# Patient Record
Sex: FEMALE | Race: WHITE | URBAN - METROPOLITAN AREA
[De-identification: names, ages, dates, MRNs, and addresses within clinical notes are randomized per-mention and may not be internally consistent; named-entity substitution may affect disease eponyms.]

---

## 2017-11-21 ENCOUNTER — TRANSFERRED RECORDS (OUTPATIENT)
Dept: HEALTH INFORMATION MANAGEMENT | Facility: CLINIC | Age: 22
End: 2017-11-21

## 2018-01-30 ENCOUNTER — ANESTHESIA EVENT (OUTPATIENT)
Dept: SURGERY | Facility: CLINIC | Age: 23
End: 2018-01-30
Payer: COMMERCIAL

## 2018-01-30 RX ORDER — ALBUTEROL SULFATE 90 UG/1
2 AEROSOL, METERED RESPIRATORY (INHALATION) EVERY 6 HOURS PRN
COMMUNITY

## 2018-01-30 RX ORDER — LORATADINE 10 MG/1
10 TABLET ORAL DAILY PRN
COMMUNITY

## 2018-01-31 ENCOUNTER — ANESTHESIA (OUTPATIENT)
Dept: SURGERY | Facility: CLINIC | Age: 23
End: 2018-01-31
Payer: COMMERCIAL

## 2018-01-31 ENCOUNTER — HOSPITAL ENCOUNTER (OUTPATIENT)
Facility: CLINIC | Age: 23
Discharge: HOME | End: 2018-02-01
Attending: OBSTETRICS & GYNECOLOGY | Admitting: OBSTETRICS & GYNECOLOGY
Payer: COMMERCIAL

## 2018-01-31 ENCOUNTER — SURGERY (OUTPATIENT)
Age: 23
End: 2018-01-31
Payer: COMMERCIAL

## 2018-01-31 DIAGNOSIS — G89.18 ACUTE POST-OPERATIVE PAIN: Primary | ICD-10-CM

## 2018-01-31 DIAGNOSIS — T40.2X5A CONSTIPATION DUE TO OPIOID THERAPY: ICD-10-CM

## 2018-01-31 DIAGNOSIS — K59.03 CONSTIPATION DUE TO OPIOID THERAPY: ICD-10-CM

## 2018-01-31 PROBLEM — R10.2 PELVIC PAIN IN FEMALE: Status: ACTIVE | Noted: 2018-01-31

## 2018-01-31 LAB
B-HCG SERPL-ACNC: <1 IU/L (ref 0–5)
HGB BLD-MCNC: 13.7 G/DL (ref 11.7–15.7)

## 2018-01-31 PROCEDURE — 25000128 H RX IP 250 OP 636: Performed by: ANESTHESIOLOGY

## 2018-01-31 PROCEDURE — 25000125 ZZHC RX 250: Performed by: OBSTETRICS & GYNECOLOGY

## 2018-01-31 PROCEDURE — 88305 TISSUE EXAM BY PATHOLOGIST: CPT | Performed by: OBSTETRICS & GYNECOLOGY

## 2018-01-31 PROCEDURE — 25000125 ZZHC RX 250: Performed by: NURSE ANESTHETIST, CERTIFIED REGISTERED

## 2018-01-31 PROCEDURE — 27210794 ZZH OR GENERAL SUPPLY STERILE: Performed by: OBSTETRICS & GYNECOLOGY

## 2018-01-31 PROCEDURE — 88305 TISSUE EXAM BY PATHOLOGIST: CPT | Mod: 26 | Performed by: OBSTETRICS & GYNECOLOGY

## 2018-01-31 PROCEDURE — 25000128 H RX IP 250 OP 636: Performed by: NURSE ANESTHETIST, CERTIFIED REGISTERED

## 2018-01-31 PROCEDURE — 84702 CHORIONIC GONADOTROPIN TEST: CPT | Performed by: OBSTETRICS & GYNECOLOGY

## 2018-01-31 PROCEDURE — 25000128 H RX IP 250 OP 636: Performed by: OBSTETRICS & GYNECOLOGY

## 2018-01-31 PROCEDURE — 36000087 ZZH SURGERY LEVEL 8 EA 15 ADDTL MIN: Performed by: OBSTETRICS & GYNECOLOGY

## 2018-01-31 PROCEDURE — 37000008 ZZH ANESTHESIA TECHNICAL FEE, 1ST 30 MIN: Performed by: OBSTETRICS & GYNECOLOGY

## 2018-01-31 PROCEDURE — 37000009 ZZH ANESTHESIA TECHNICAL FEE, EACH ADDTL 15 MIN: Performed by: OBSTETRICS & GYNECOLOGY

## 2018-01-31 PROCEDURE — 36415 COLL VENOUS BLD VENIPUNCTURE: CPT | Performed by: OBSTETRICS & GYNECOLOGY

## 2018-01-31 PROCEDURE — 36000085 ZZH SURGERY LEVEL 8 1ST 30 MIN: Performed by: OBSTETRICS & GYNECOLOGY

## 2018-01-31 PROCEDURE — 40000170 ZZH STATISTIC PRE-PROCEDURE ASSESSMENT II: Performed by: OBSTETRICS & GYNECOLOGY

## 2018-01-31 PROCEDURE — 71000013 ZZH RECOVERY PHASE 1 LEVEL 1 EA ADDTL HR: Performed by: OBSTETRICS & GYNECOLOGY

## 2018-01-31 PROCEDURE — 40000934 ZZH STATISTIC OUTPATIENT (NON-OBS) DAY

## 2018-01-31 PROCEDURE — 25800025 ZZH RX 258: Performed by: OBSTETRICS & GYNECOLOGY

## 2018-01-31 PROCEDURE — C1765 ADHESION BARRIER: HCPCS | Performed by: OBSTETRICS & GYNECOLOGY

## 2018-01-31 PROCEDURE — 85018 HEMOGLOBIN: CPT | Performed by: OBSTETRICS & GYNECOLOGY

## 2018-01-31 PROCEDURE — 25000132 ZZH RX MED GY IP 250 OP 250 PS 637: Performed by: OBSTETRICS & GYNECOLOGY

## 2018-01-31 PROCEDURE — 71000012 ZZH RECOVERY PHASE 1 LEVEL 1 FIRST HR: Performed by: OBSTETRICS & GYNECOLOGY

## 2018-01-31 PROCEDURE — 25000128 H RX IP 250 OP 636

## 2018-01-31 PROCEDURE — 27210995 ZZH RX 272: Performed by: OBSTETRICS & GYNECOLOGY

## 2018-01-31 PROCEDURE — 40000936 ZZH STATISTIC OUTPATIENT (NON-OBS) NIGHT

## 2018-01-31 PROCEDURE — 25000566 ZZH SEVOFLURANE, EA 15 MIN: Performed by: OBSTETRICS & GYNECOLOGY

## 2018-01-31 PROCEDURE — 40000935 ZZH STATISTIC OUTPATIENT (NON-OBS) EVE

## 2018-01-31 RX ORDER — SODIUM CHLORIDE, SODIUM LACTATE, POTASSIUM CHLORIDE, CALCIUM CHLORIDE 600; 310; 30; 20 MG/100ML; MG/100ML; MG/100ML; MG/100ML
INJECTION, SOLUTION INTRAVENOUS CONTINUOUS
Status: DISCONTINUED | OUTPATIENT
Start: 2018-01-31 | End: 2018-02-01 | Stop reason: HOSPADM

## 2018-01-31 RX ORDER — ONDANSETRON 4 MG/1
4 TABLET, ORALLY DISINTEGRATING ORAL EVERY 30 MIN PRN
Status: DISCONTINUED | OUTPATIENT
Start: 2018-01-31 | End: 2018-01-31 | Stop reason: HOSPADM

## 2018-01-31 RX ORDER — IBUPROFEN 600 MG/1
600 TABLET, FILM COATED ORAL EVERY 6 HOURS PRN
Qty: 30 TABLET | Refills: 0 | Status: SHIPPED | OUTPATIENT
Start: 2018-01-31

## 2018-01-31 RX ORDER — OXYCODONE AND ACETAMINOPHEN 5; 325 MG/1; MG/1
1-2 TABLET ORAL EVERY 4 HOURS PRN
Qty: 30 TABLET | Refills: 0 | Status: SHIPPED | OUTPATIENT
Start: 2018-01-31

## 2018-01-31 RX ORDER — ALBUTEROL SULFATE 0.83 MG/ML
2.5 SOLUTION RESPIRATORY (INHALATION) EVERY 4 HOURS PRN
Status: DISCONTINUED | OUTPATIENT
Start: 2018-01-31 | End: 2018-01-31 | Stop reason: HOSPADM

## 2018-01-31 RX ORDER — ONDANSETRON 4 MG/1
4 TABLET, ORALLY DISINTEGRATING ORAL EVERY 6 HOURS PRN
Status: DISCONTINUED | OUTPATIENT
Start: 2018-01-31 | End: 2018-02-01 | Stop reason: HOSPADM

## 2018-01-31 RX ORDER — PROPOFOL 10 MG/ML
INJECTION, EMULSION INTRAVENOUS CONTINUOUS PRN
Status: DISCONTINUED | OUTPATIENT
Start: 2018-01-31 | End: 2018-01-31

## 2018-01-31 RX ORDER — SODIUM CHLORIDE, SODIUM LACTATE, POTASSIUM CHLORIDE, CALCIUM CHLORIDE 600; 310; 30; 20 MG/100ML; MG/100ML; MG/100ML; MG/100ML
INJECTION, SOLUTION INTRAVENOUS CONTINUOUS
Status: DISCONTINUED | OUTPATIENT
Start: 2018-01-31 | End: 2018-01-31 | Stop reason: HOSPADM

## 2018-01-31 RX ORDER — FENTANYL CITRATE 0.05 MG/ML
25-50 INJECTION, SOLUTION INTRAMUSCULAR; INTRAVENOUS
Status: DISCONTINUED | OUTPATIENT
Start: 2018-01-31 | End: 2018-01-31 | Stop reason: HOSPADM

## 2018-01-31 RX ORDER — MEPERIDINE HYDROCHLORIDE 25 MG/ML
12.5 INJECTION INTRAMUSCULAR; INTRAVENOUS; SUBCUTANEOUS
Status: DISCONTINUED | OUTPATIENT
Start: 2018-01-31 | End: 2018-01-31 | Stop reason: HOSPADM

## 2018-01-31 RX ORDER — KETOROLAC TROMETHAMINE 30 MG/ML
30 INJECTION, SOLUTION INTRAMUSCULAR; INTRAVENOUS EVERY 6 HOURS
Status: COMPLETED | OUTPATIENT
Start: 2018-01-31 | End: 2018-02-01

## 2018-01-31 RX ORDER — HYDROMORPHONE HYDROCHLORIDE 1 MG/ML
INJECTION, SOLUTION INTRAMUSCULAR; INTRAVENOUS; SUBCUTANEOUS
Status: COMPLETED
Start: 2018-01-31 | End: 2018-01-31

## 2018-01-31 RX ORDER — METOCLOPRAMIDE 10 MG/1
10 TABLET ORAL EVERY 6 HOURS PRN
Status: DISCONTINUED | OUTPATIENT
Start: 2018-01-31 | End: 2018-02-01 | Stop reason: HOSPADM

## 2018-01-31 RX ORDER — NALOXONE HYDROCHLORIDE 0.4 MG/ML
.1-.4 INJECTION, SOLUTION INTRAMUSCULAR; INTRAVENOUS; SUBCUTANEOUS
Status: DISCONTINUED | OUTPATIENT
Start: 2018-01-31 | End: 2018-01-31 | Stop reason: HOSPADM

## 2018-01-31 RX ORDER — NEOSTIGMINE METHYLSULFATE 1 MG/ML
VIAL (ML) INJECTION PRN
Status: DISCONTINUED | OUTPATIENT
Start: 2018-01-31 | End: 2018-01-31

## 2018-01-31 RX ORDER — METOCLOPRAMIDE HYDROCHLORIDE 5 MG/ML
10 INJECTION INTRAMUSCULAR; INTRAVENOUS EVERY 6 HOURS PRN
Status: DISCONTINUED | OUTPATIENT
Start: 2018-01-31 | End: 2018-02-01 | Stop reason: HOSPADM

## 2018-01-31 RX ORDER — SODIUM CHLORIDE, SODIUM LACTATE, POTASSIUM CHLORIDE, CALCIUM CHLORIDE 600; 310; 30; 20 MG/100ML; MG/100ML; MG/100ML; MG/100ML
INJECTION, SOLUTION INTRAVENOUS CONTINUOUS PRN
Status: DISCONTINUED | OUTPATIENT
Start: 2018-01-31 | End: 2018-01-31

## 2018-01-31 RX ORDER — ONDANSETRON 2 MG/ML
INJECTION INTRAMUSCULAR; INTRAVENOUS PRN
Status: DISCONTINUED | OUTPATIENT
Start: 2018-01-31 | End: 2018-01-31

## 2018-01-31 RX ORDER — CALCIUM CARBONATE 500 MG/1
1000 TABLET, CHEWABLE ORAL 4 TIMES DAILY PRN
Status: DISCONTINUED | OUTPATIENT
Start: 2018-01-31 | End: 2018-02-01 | Stop reason: HOSPADM

## 2018-01-31 RX ORDER — MAGNESIUM HYDROXIDE 1200 MG/15ML
LIQUID ORAL PRN
Status: DISCONTINUED | OUTPATIENT
Start: 2018-01-31 | End: 2018-01-31 | Stop reason: HOSPADM

## 2018-01-31 RX ORDER — ONDANSETRON 2 MG/ML
4 INJECTION INTRAMUSCULAR; INTRAVENOUS EVERY 6 HOURS PRN
Status: DISCONTINUED | OUTPATIENT
Start: 2018-01-31 | End: 2018-02-01 | Stop reason: HOSPADM

## 2018-01-31 RX ORDER — NALOXONE HYDROCHLORIDE 0.4 MG/ML
.1-.4 INJECTION, SOLUTION INTRAMUSCULAR; INTRAVENOUS; SUBCUTANEOUS
Status: DISCONTINUED | OUTPATIENT
Start: 2018-01-31 | End: 2018-02-01 | Stop reason: HOSPADM

## 2018-01-31 RX ORDER — HYDROXYZINE HYDROCHLORIDE 25 MG/1
25 TABLET, FILM COATED ORAL EVERY 6 HOURS PRN
Qty: 30 TABLET | Refills: 0 | Status: SHIPPED | OUTPATIENT
Start: 2018-01-31

## 2018-01-31 RX ORDER — KETOROLAC TROMETHAMINE 30 MG/ML
INJECTION, SOLUTION INTRAMUSCULAR; INTRAVENOUS PRN
Status: DISCONTINUED | OUTPATIENT
Start: 2018-01-31 | End: 2018-01-31

## 2018-01-31 RX ORDER — HYDROMORPHONE HYDROCHLORIDE 1 MG/ML
0.2 INJECTION, SOLUTION INTRAMUSCULAR; INTRAVENOUS; SUBCUTANEOUS
Status: DISCONTINUED | OUTPATIENT
Start: 2018-01-31 | End: 2018-02-01 | Stop reason: HOSPADM

## 2018-01-31 RX ORDER — PROPOFOL 10 MG/ML
INJECTION, EMULSION INTRAVENOUS PRN
Status: DISCONTINUED | OUTPATIENT
Start: 2018-01-31 | End: 2018-01-31

## 2018-01-31 RX ORDER — GLYCOPYRROLATE 0.2 MG/ML
INJECTION, SOLUTION INTRAMUSCULAR; INTRAVENOUS PRN
Status: DISCONTINUED | OUTPATIENT
Start: 2018-01-31 | End: 2018-01-31

## 2018-01-31 RX ORDER — HYDRALAZINE HYDROCHLORIDE 20 MG/ML
2.5-5 INJECTION INTRAMUSCULAR; INTRAVENOUS EVERY 10 MIN PRN
Status: DISCONTINUED | OUTPATIENT
Start: 2018-01-31 | End: 2018-01-31 | Stop reason: HOSPADM

## 2018-01-31 RX ORDER — VECURONIUM BROMIDE 1 MG/ML
INJECTION, POWDER, LYOPHILIZED, FOR SOLUTION INTRAVENOUS PRN
Status: DISCONTINUED | OUTPATIENT
Start: 2018-01-31 | End: 2018-01-31

## 2018-01-31 RX ORDER — IBUPROFEN 600 MG/1
600 TABLET, FILM COATED ORAL EVERY 6 HOURS PRN
Status: DISCONTINUED | OUTPATIENT
Start: 2018-02-01 | End: 2018-02-01 | Stop reason: HOSPADM

## 2018-01-31 RX ORDER — AMOXICILLIN 250 MG
1-2 CAPSULE ORAL 2 TIMES DAILY
Qty: 30 TABLET | Refills: 0 | Status: SHIPPED | OUTPATIENT
Start: 2018-01-31

## 2018-01-31 RX ORDER — BUPIVACAINE HYDROCHLORIDE AND EPINEPHRINE 2.5; 5 MG/ML; UG/ML
INJECTION, SOLUTION INFILTRATION; PERINEURAL PRN
Status: DISCONTINUED | OUTPATIENT
Start: 2018-01-31 | End: 2018-01-31 | Stop reason: HOSPADM

## 2018-01-31 RX ORDER — FENTANYL CITRATE 50 UG/ML
INJECTION, SOLUTION INTRAMUSCULAR; INTRAVENOUS PRN
Status: DISCONTINUED | OUTPATIENT
Start: 2018-01-31 | End: 2018-01-31

## 2018-01-31 RX ORDER — ONDANSETRON 2 MG/ML
4 INJECTION INTRAMUSCULAR; INTRAVENOUS EVERY 30 MIN PRN
Status: DISCONTINUED | OUTPATIENT
Start: 2018-01-31 | End: 2018-01-31 | Stop reason: HOSPADM

## 2018-01-31 RX ORDER — LIDOCAINE HYDROCHLORIDE 20 MG/ML
INJECTION, SOLUTION INFILTRATION; PERINEURAL PRN
Status: DISCONTINUED | OUTPATIENT
Start: 2018-01-31 | End: 2018-01-31

## 2018-01-31 RX ORDER — PROCHLORPERAZINE MALEATE 10 MG
10 TABLET ORAL EVERY 6 HOURS PRN
Status: DISCONTINUED | OUTPATIENT
Start: 2018-01-31 | End: 2018-02-01 | Stop reason: HOSPADM

## 2018-01-31 RX ORDER — CEFAZOLIN SODIUM 1 G
1 VIAL (EA) INJECTION SEE ADMIN INSTRUCTIONS
Status: DISCONTINUED | OUTPATIENT
Start: 2018-01-31 | End: 2018-01-31 | Stop reason: HOSPADM

## 2018-01-31 RX ORDER — OXYCODONE AND ACETAMINOPHEN 5; 325 MG/1; MG/1
1-2 TABLET ORAL EVERY 4 HOURS PRN
Status: DISCONTINUED | OUTPATIENT
Start: 2018-01-31 | End: 2018-02-01 | Stop reason: HOSPADM

## 2018-01-31 RX ORDER — HYDROXYZINE HYDROCHLORIDE 25 MG/1
25 TABLET, FILM COATED ORAL EVERY 6 HOURS PRN
Status: DISCONTINUED | OUTPATIENT
Start: 2018-01-31 | End: 2018-02-01 | Stop reason: HOSPADM

## 2018-01-31 RX ORDER — DEXAMETHASONE SODIUM PHOSPHATE 4 MG/ML
INJECTION, SOLUTION INTRA-ARTICULAR; INTRALESIONAL; INTRAMUSCULAR; INTRAVENOUS; SOFT TISSUE PRN
Status: DISCONTINUED | OUTPATIENT
Start: 2018-01-31 | End: 2018-01-31

## 2018-01-31 RX ORDER — LABETALOL HYDROCHLORIDE 5 MG/ML
10 INJECTION, SOLUTION INTRAVENOUS
Status: DISCONTINUED | OUTPATIENT
Start: 2018-01-31 | End: 2018-01-31 | Stop reason: HOSPADM

## 2018-01-31 RX ORDER — LIDOCAINE 40 MG/G
CREAM TOPICAL
Status: DISCONTINUED | OUTPATIENT
Start: 2018-01-31 | End: 2018-02-01 | Stop reason: HOSPADM

## 2018-01-31 RX ORDER — MULTIPLE VITAMINS W/ MINERALS TAB 9MG-400MCG
1 TAB ORAL DAILY
COMMUNITY

## 2018-01-31 RX ORDER — HYDROMORPHONE HYDROCHLORIDE 1 MG/ML
.3-.5 INJECTION, SOLUTION INTRAMUSCULAR; INTRAVENOUS; SUBCUTANEOUS EVERY 10 MIN PRN
Status: DISCONTINUED | OUTPATIENT
Start: 2018-01-31 | End: 2018-01-31 | Stop reason: HOSPADM

## 2018-01-31 RX ADMIN — Medication 0.5 MG: at 10:17

## 2018-01-31 RX ADMIN — HYDROMORPHONE HYDROCHLORIDE 0.2 MG: 1 INJECTION, SOLUTION INTRAMUSCULAR; INTRAVENOUS; SUBCUTANEOUS at 20:07

## 2018-01-31 RX ADMIN — FENTANYL CITRATE 50 MCG: 50 INJECTION, SOLUTION INTRAMUSCULAR; INTRAVENOUS at 10:34

## 2018-01-31 RX ADMIN — FENTANYL CITRATE 50 MCG: 50 INJECTION, SOLUTION INTRAMUSCULAR; INTRAVENOUS at 08:06

## 2018-01-31 RX ADMIN — GLYCOPYRROLATE 0.8 MG: 0.2 INJECTION, SOLUTION INTRAMUSCULAR; INTRAVENOUS at 09:36

## 2018-01-31 RX ADMIN — BUPIVACAINE HYDROCHLORIDE AND EPINEPHRINE BITARTRATE 30 ML: 2.5; .005 INJECTION, SOLUTION EPIDURAL; INFILTRATION; INTRACAUDAL; PERINEURAL at 09:28

## 2018-01-31 RX ADMIN — ROCURONIUM BROMIDE 50 MG: 10 INJECTION INTRAVENOUS at 07:38

## 2018-01-31 RX ADMIN — VECURONIUM BROMIDE 1 MG: 1 INJECTION, POWDER, LYOPHILIZED, FOR SOLUTION INTRAVENOUS at 09:00

## 2018-01-31 RX ADMIN — PROPOFOL 50 MCG/KG/MIN: 10 INJECTION, EMULSION INTRAVENOUS at 07:38

## 2018-01-31 RX ADMIN — FENTANYL CITRATE 50 MCG: 50 INJECTION, SOLUTION INTRAMUSCULAR; INTRAVENOUS at 10:05

## 2018-01-31 RX ADMIN — SODIUM CHLORIDE 1000 ML: 900 IRRIGANT IRRIGATION at 08:03

## 2018-01-31 RX ADMIN — VECURONIUM BROMIDE 1 MG: 1 INJECTION, POWDER, LYOPHILIZED, FOR SOLUTION INTRAVENOUS at 08:37

## 2018-01-31 RX ADMIN — DEXAMETHASONE SODIUM PHOSPHATE 4 MG: 4 INJECTION, SOLUTION INTRA-ARTICULAR; INTRALESIONAL; INTRAMUSCULAR; INTRAVENOUS; SOFT TISSUE at 07:45

## 2018-01-31 RX ADMIN — ONDANSETRON 4 MG: 2 INJECTION INTRAMUSCULAR; INTRAVENOUS at 10:49

## 2018-01-31 RX ADMIN — SODIUM CHLORIDE, POTASSIUM CHLORIDE, SODIUM LACTATE AND CALCIUM CHLORIDE: 600; 310; 30; 20 INJECTION, SOLUTION INTRAVENOUS at 09:01

## 2018-01-31 RX ADMIN — RANITIDINE 150 MG: 150 TABLET ORAL at 21:30

## 2018-01-31 RX ADMIN — MIDAZOLAM 2 MG: 1 INJECTION INTRAMUSCULAR; INTRAVENOUS at 07:34

## 2018-01-31 RX ADMIN — MEPERIDINE HYDROCHLORIDE 12.5 MG: 25 INJECTION, SOLUTION INTRAMUSCULAR; INTRAVENOUS; SUBCUTANEOUS at 10:15

## 2018-01-31 RX ADMIN — NEOSTIGMINE METHYLSULFATE 4 MG: 1 INJECTION INTRAMUSCULAR; INTRAVENOUS; SUBCUTANEOUS at 09:36

## 2018-01-31 RX ADMIN — HYDROMORPHONE HYDROCHLORIDE 0.2 MG: 1 INJECTION, SOLUTION INTRAMUSCULAR; INTRAVENOUS; SUBCUTANEOUS at 12:29

## 2018-01-31 RX ADMIN — KETOROLAC TROMETHAMINE 30 MG: 30 INJECTION, SOLUTION INTRAMUSCULAR at 21:30

## 2018-01-31 RX ADMIN — SODIUM CHLORIDE, POTASSIUM CHLORIDE, SODIUM LACTATE AND CALCIUM CHLORIDE: 600; 310; 30; 20 INJECTION, SOLUTION INTRAVENOUS at 07:33

## 2018-01-31 RX ADMIN — DEXMEDETOMIDINE HYDROCHLORIDE 8 MCG: 100 INJECTION, SOLUTION INTRAVENOUS at 08:02

## 2018-01-31 RX ADMIN — KETOROLAC TROMETHAMINE 30 MG: 30 INJECTION, SOLUTION INTRAMUSCULAR at 09:37

## 2018-01-31 RX ADMIN — ONDANSETRON 4 MG: 2 INJECTION INTRAMUSCULAR; INTRAVENOUS at 19:48

## 2018-01-31 RX ADMIN — CEFAZOLIN SODIUM 2 G: 2 SOLUTION INTRAVENOUS at 07:45

## 2018-01-31 RX ADMIN — ONDANSETRON 4 MG: 2 INJECTION INTRAMUSCULAR; INTRAVENOUS at 13:09

## 2018-01-31 RX ADMIN — PROPOFOL 200 MG: 10 INJECTION, EMULSION INTRAVENOUS at 07:38

## 2018-01-31 RX ADMIN — OXYCODONE HYDROCHLORIDE AND ACETAMINOPHEN 2 TABLET: 5; 325 TABLET ORAL at 13:40

## 2018-01-31 RX ADMIN — FENTANYL CITRATE 100 MCG: 50 INJECTION, SOLUTION INTRAMUSCULAR; INTRAVENOUS at 07:34

## 2018-01-31 RX ADMIN — VECURONIUM BROMIDE 1 MG: 1 INJECTION, POWDER, LYOPHILIZED, FOR SOLUTION INTRAVENOUS at 08:29

## 2018-01-31 RX ADMIN — FENTANYL CITRATE 50 MCG: 50 INJECTION, SOLUTION INTRAMUSCULAR; INTRAVENOUS at 10:30

## 2018-01-31 RX ADMIN — Medication 0.5 MG: at 11:10

## 2018-01-31 RX ADMIN — ONDANSETRON 4 MG: 2 INJECTION INTRAMUSCULAR; INTRAVENOUS at 09:30

## 2018-01-31 RX ADMIN — DEXMEDETOMIDINE HYDROCHLORIDE 12 MCG: 100 INJECTION, SOLUTION INTRAVENOUS at 07:56

## 2018-01-31 RX ADMIN — LIDOCAINE HYDROCHLORIDE 65 MG: 20 INJECTION, SOLUTION INFILTRATION; PERINEURAL at 07:38

## 2018-01-31 RX ADMIN — HYDROMORPHONE HYDROCHLORIDE 0.2 MG: 1 INJECTION, SOLUTION INTRAMUSCULAR; INTRAVENOUS; SUBCUTANEOUS at 16:24

## 2018-01-31 RX ADMIN — SODIUM CHLORIDE 1000 ML: 900 IRRIGANT IRRIGATION at 09:28

## 2018-01-31 RX ADMIN — KETOROLAC TROMETHAMINE 30 MG: 30 INJECTION, SOLUTION INTRAMUSCULAR at 15:41

## 2018-01-31 ASSESSMENT — PAIN DESCRIPTION - DESCRIPTORS
DESCRIPTORS: SORE;CRAMPING
DESCRIPTORS: CRAMPING;SORE

## 2018-01-31 NOTE — ANESTHESIA PREPROCEDURE EVALUATION
Anesthesia Evaluation     . Pt has had prior anesthetic.     No history of anesthetic complications          ROS/MED HX    ENT/Pulmonary:     (+)asthma , . .   (-) sleep apnea   Neurologic:       Cardiovascular:         METS/Exercise Tolerance:     Hematologic:         Musculoskeletal:         GI/Hepatic:     (+) GERD       Renal/Genitourinary: Comment: Endometriosis        Endo:         Psychiatric:         Infectious Disease:         Malignancy:         Other:                                    Anesthesia Plan      History & Physical Review  History and physical reviewed and following examination; no interval change.    ASA Status:  2 .    NPO Status:  > 8 hours    Plan for General and ETT with Intravenous induction. Maintenance will be Balanced.    PONV prophylaxis:  Ondansetron (or other 5HT-3) and Dexamethasone or Solumedrol (Propofol)       Postoperative Care  Postoperative pain management:  IV analgesics and Oral pain medications.      Consents  Anesthetic plan, risks, benefits and alternatives discussed with:  Patient..                      Procedure: Procedure(s):  DAVINCI ASSISTED ABLATION / EXCISION OF ENDOMETRIOSIS  COMBINED DILATION AND CURETTAGE, OPERATIVE HYSTEROSCOPY  CYSTOSCOPY  Preop diagnosis: PELVIC PAIN, ENDOMETRIOSIS    No Known Allergies  Past Medical History:   Diagnosis Date     Anxiety      Endometriosis      Myoclonus      PTSD (post-traumatic stress disorder)      Uncomplicated asthma      Past Surgical History:   Procedure Laterality Date     GYN SURGERY  08/2014    laparoscopy     GYN SURGERY  06/2016    laparoscopy     HEAD & NECK SURGERY      tonsillectomy     Social History   Substance Use Topics     Smoking status: Never Smoker     Smokeless tobacco: Never Used     Alcohol use Yes      Comment: OCCASIONALLY     Prior to Admission medications    Medication Sig Start Date End Date Taking? Authorizing Provider   multivitamin, therapeutic with minerals (THERA-VIT-M) TABS tablet Take 1  tablet by mouth daily   Yes Reported, Patient   Acetaminophen (TYLENOL PO) Take 325-650 mg by mouth daily as needed for mild pain or fever   Yes Reported, Patient   PANTOPRAZOLE SODIUM PO Take 40 mg by mouth every morning (before breakfast)   Yes Reported, Patient   NAPROXEN PO Take 550 mg by mouth 2 times daily as needed for moderate pain   Yes Reported, Patient   albuterol (PROAIR HFA/PROVENTIL HFA/VENTOLIN HFA) 108 (90 BASE) MCG/ACT Inhaler Inhale 2 puffs into the lungs every 6 hours as needed for shortness of breath / dyspnea or wheezing   Yes Reported, Patient   beclomethasone (QVAR) 80 MCG/ACT Inhaler Inhale 1 puff into the lungs 2 times daily   Yes Reported, Patient   loratadine (CLARITIN) 10 MG tablet Take 10 mg by mouth daily as needed    Yes Reported, Patient     Current Facility-Administered Medications Ordered in Epic   Medication Dose Route Frequency Last Rate Last Dose     ceFAZolin (ANCEF) intermittent infusion 2 g in 50 mL dextrose PREMIX  2 g Intravenous Pre-Op/Pre-procedure x 1 dose         ceFAZolin (ANCEF) 1g in 10 mL SWFI premix for IVP  1 g Intravenous See Admin Instructions         No current [facility]-ordered outpatient prescriptions on file.       Wt Readings from Last 1 Encounters:   01/31/18 84.5 kg (186 lb 4.8 oz)     Temp Readings from Last 1 Encounters:   01/31/18 36.7  C (98  F) (Temporal)     BP Readings from Last 6 Encounters:   01/31/18 132/82     Pulse Readings from Last 4 Encounters:   No data found for Pulse     Resp Readings from Last 1 Encounters:   01/31/18 16     SpO2 Readings from Last 1 Encounters:   01/31/18 98%     No results for input(s): NA, POTASSIUM, CHLORIDE, CO2, ANIONGAP, GLC, BUN, CR, ARTEMIO in the last 47072 hours.  No results for input(s): AST, ALT, ALKPHOS, BILITOTAL, LIPASE in the last 57352 hours.  Recent Labs   Lab Test  01/31/18   0559   HGB  13.7     No results for input(s): ABO, RH in the last 23697 hours.  No results for input(s): INR, PTT in the last 50856  hours.   No results for input(s): TROPI in the last 48666 hours.  No results for input(s): PH, PCO2, PO2, HCO3 in the last 16513 hours.  No results for input(s): HCG in the last 99809 hours.  No results found for this or any previous visit (from the past 744 hour(s)).    RECENT LABS:   ECG:   ECHO:

## 2018-01-31 NOTE — ANESTHESIA POSTPROCEDURE EVALUATION
Patient: Micki Lehman    Procedure(s):  DAVINCI LAPAROSCOPY, EXCISION OF ENDOMETRIOSIS, BILATERAL URTEROLYSIS, CYSTOSCOPY, HYSTEROSCOPY, DILATION AND CURETTAGE - Wound Class: I-Clean   - Wound Class: II-Clean Contaminated   - Wound Class: II-Clean Contaminated   - Wound Class: I-Clean    Diagnosis:PELVIC PAIN, ENDOMETRIOSIS  Diagnosis Additional Information: No value filed.    Anesthesia Type:  General, ETT    Note:  Anesthesia Post Evaluation    Patient location during evaluation: PACU  Patient participation: Able to fully participate in evaluation  Level of consciousness: awake  Pain management: adequate  Airway patency: patent  Cardiovascular status: acceptable  Respiratory status: acceptable  Hydration status: acceptable  PONV: none     Anesthetic complications: None          Last vitals:  Vitals:    01/31/18 1245 01/31/18 1340 01/31/18 1549   BP: 107/55 118/66 122/66   Resp: 18 16 16   Temp:   37.2  C (99  F)   SpO2: 96% 99% 97%         Electronically Signed By: Nubia Hollis MD, MD  January 31, 2018  4:13 PM

## 2018-01-31 NOTE — PROGRESS NOTES
Patient assisted to the bathroom.  SBA.  Will bladder scan post void to check for residual amounts.

## 2018-01-31 NOTE — OR NURSING
Report given to charge nurse on obs unit at 11:44.  Pt going to room 20. Verbalizes readiness for discharge to her room.

## 2018-01-31 NOTE — IP AVS SNAPSHOT
[facility] Observation Unit    [address] [place] [id number]    Phone:  [phone]                                       After Visit Summary   1/31/2018    Micki Lehman    MRN: [id number]           After Visit Summary Signature Page     I have received my discharge instructions, and my questions have been answered. I have discussed any challenges I see with this plan with the nurse or doctor.    ..........................................................................................................................................  Patient/Patient Representative Signature      ..........................................................................................................................................  Patient Representative Print Name and Relationship to Patient    ..................................................               ................................................  Date                                            Time    ..........................................................................................................................................  Reviewed by Signature/Title    ...................................................              ..............................................  Date                                                            Time

## 2018-01-31 NOTE — PROGRESS NOTES
Admission medication history interview status for the 1/31/2018  admission is complete. See EPIC admission navigator for prior to admission medications     Medication history source reliability:Good    Medication history interview source(s):Patient    Medication history resources (including written lists, pill bottles, clinic record):None    Primary pharmacy.[facility]    Additional medication history information not noted on PTA med list :None    Time spent in this activity: 45 MINUTES    Prior to Admission medications    Medication Sig Last Dose Taking? Auth Provider   multivitamin, therapeutic with minerals (THERA-VIT-M) TABS tablet Take 1 tablet by mouth daily 1/24/2018 Yes Reported, Patient   Acetaminophen (TYLENOL PO) Take 325-650 mg by mouth daily as needed for mild pain or fever 1/29/2018 at PRN Yes Reported, Patient   PANTOPRAZOLE SODIUM PO Take 40 mg by mouth every morning (before breakfast) 1/30/2018 at AM Yes Reported, Patient   NAPROXEN PO Take 550 mg by mouth 2 times daily as needed for moderate pain 1/17/2018 at PRN Yes Reported, Patient   albuterol (PROAIR HFA/PROVENTIL HFA/VENTOLIN HFA) 108 (90 BASE) MCG/ACT Inhaler Inhale 2 puffs into the lungs every 6 hours as needed for shortness of breath / dyspnea or wheezing MORE THAN A WEEK at PRN Yes Reported, Patient   beclomethasone (QVAR) 80 MCG/ACT Inhaler Inhale 1 puff into the lungs 2 times daily 1/30/2018 at PM Yes Reported, Patient   loratadine (CLARITIN) 10 MG tablet Take 10 mg by mouth daily as needed  MORE THAN A MONTH at PRN Yes Reported, Patient

## 2018-01-31 NOTE — IP AVS SNAPSHOT
MRN:[id number]                      After Visit Summary   1/31/2018    Micki Lehman    MRN: [id number]           Thank you!     Thank you for choosing [facility] for your care. Our goal is always to provide you with excellent care. Hearing back from our patients is one way we can continue to improve our services. Please take a few minutes to complete the written survey that you may receive in the mail after you visit with us. Thank you!        Patient Information     Date Of Birth          1995        Designated Caregiver       Most Recent Value    Caregiver    Will someone help with your care after discharge? yes    Name of designated caregiver ABEBA BEVERLY    Phone number of caregiver [phone]    Caregiver address [address] [facility] [address]      About your hospital stay     You were admitted on:  January 31, 2018 [facility]    You were discharged on:  February 1, 2018       Who to Call     For medical emergencies, please call 911.  For non-urgent questions about your medical care, please call your primary care provider or clinic, None  For questions related to your surgery, please call your surgery clinic        Attending Provider     Provider Ck Troy MD OB/Gyn       Primary Care Provider Fax #    Provider Not In System [phone]      After Care Instructions     Diet Instructions       Resume pre-procedure diet            Discharge Instructions       Patient to follow up with appointment in 2 weeks            No Alcohol       For 24 hours post procedure            No driving or operating machinery        until off of narcotic pain medications            No lifting        No lifting over 25 lbs and no strenuous physical activity for 2 weeks            Shower       No shower for 24 hours post procedure. May shower Postoperative Day (POD)  1                  Pending Results     Date and Time Order  "Name Status Description    2018 0823 Surgical pathology exam In process             Statement of Approval     Ordered          18 0654  I have reviewed and agree with all the recommendations and orders detailed in this document.  EFFECTIVE NOW     Approved and electronically signed by:  Ck Crowley MD             Admission Information     Date & Time Provider Department Dept. Phone    2018 Ck Crowley MD [facility] Observation Unit [phone]      Your Vitals Were     Blood Pressure Temperature Respirations Height Weight Last Period    103/48 (BP Location: Right arm) 99  F (37.2  C) (Oral) 16 1.626 m (5' 4\") 84.5 kg (186 lb 4.8 oz) 2018    Pulse Oximetry BMI (Body Mass Index)                95% 31.98 kg/m2          [employer] Information     [employer] lets you send messages to your doctor, view your test results, renew your prescriptions, schedule appointments and more. To sign up, go to [email or web address] . Click on \"Log in\" on the left side of the screen, which will take you to the Welcome page. Then click on \"Sign up Now\" on the right side of the page.     You will be asked to enter the access code listed below, as well as some personal information. Please follow the directions to create your username and password.     Your access code is: [email or web address]  Expires: 2018  4:59 PM     Your access code will  in 90 days. If you need help or a new code, please call your [facility] clinic or [phone].        Care EveryWhere ID     This is your Care EveryWhere ID. This could be used by other organizations to access your [facility] medical records  [id number]        Equal Access to Services     [facility] AH: Hadii jennifer Tran, waavtarda luqadaha, qaybta kaalmada adedenizda, panfilo andrade. So [facility] [phone].    ATENCIÓN: Si habla español, tiene a pfeiffer disposición servicios gratuitos de asistencia lingüística. Llame al " [phone].    We comply with applicable federal civil rights laws and [place] laws. We do not discriminate on the basis of race, color, national origin, age, disability, sex, sexual orientation, or gender identity.               Review of your medicines      START taking        Dose / Directions    hydrOXYzine 25 MG tablet   Commonly known as:  ATARAX   Used for:  Acute post-operative pain        Dose:  25 mg   Take 1 tablet (25 mg) by mouth every 6 hours as needed for itching (and nausea and pain)   Quantity:  30 tablet   Refills:  0       ibuprofen 600 MG tablet   Commonly known as:  ADVIL/MOTRIN   Used for:  Acute post-operative pain        Dose:  600 mg   Take 1 tablet (600 mg) by mouth every 6 hours as needed for pain (mild)   Quantity:  30 tablet   Refills:  0       oxyCODONE-acetaminophen 5-325 MG per tablet   Commonly known as:  PERCOCET   Used for:  Acute post-operative pain        Dose:  1-2 tablet   Take 1-2 tablets by mouth every 4 hours as needed for pain (moderate to severe)   Quantity:  30 tablet   Refills:  0       senna-docusate 8.6-50 MG per tablet   Commonly known as:  SENOKOT-S;PERICOLACE   Used for:  Constipation due to opioid therapy        Dose:  1-2 tablet   Take 1-2 tablets by mouth 2 times daily Take while on oral narcotics to prevent or treat constipation.   Quantity:  30 tablet   Refills:  0         CONTINUE these medicines which have NOT CHANGED        Dose / Directions    albuterol 108 (90 BASE) MCG/ACT Inhaler   Commonly known as:  PROAIR HFA/PROVENTIL HFA/VENTOLIN HFA        Dose:  2 puff   Inhale 2 puffs into the lungs every 6 hours as needed for shortness of breath / dyspnea or wheezing   Refills:  0       beclomethasone 80 MCG/ACT Inhaler   Commonly known as:  QVAR        Dose:  1 puff   Inhale 1 puff into the lungs 2 times daily   Refills:  0       loratadine 10 MG tablet   Commonly known as:  CLARITIN        Dose:  10 mg   Take 10 mg by mouth daily as needed   Refills:  0        multivitamin, therapeutic with minerals Tabs tablet        Dose:  1 tablet   Take 1 tablet by mouth daily   Refills:  0       PANTOPRAZOLE SODIUM PO        Dose:  40 mg   Take 40 mg by mouth every morning (before breakfast)   Refills:  0         STOP taking     NAPROXEN PO           TYLENOL PO                Where to get your medicines      Some of these will need a paper prescription and others can be bought over the counter. Ask your nurse if you have questions.     Bring a paper prescription for each of these medications     hydrOXYzine 25 MG tablet    ibuprofen 600 MG tablet    oxyCODONE-acetaminophen 5-325 MG per tablet    senna-docusate 8.6-50 MG per tablet                Protect others around you: Learn how to safely use, store and throw away your medicines at www.disposemymeds.org.        Information about OPIOIDS     PRESCRIPTION OPIOIDS: WHAT YOU NEED TO KNOW    Prescription opioids can be used to help relieve moderate to severe pain and are often prescribed following a surgery or injury, or for certain health conditions. These medications can be an important part of treatment but also come with serious risks. It is important to work with your health care provider to make sure you are getting the safest, most effective care.    WHAT ARE THE RISKS AND SIDE EFFECTS OF OPIOID USE?  Prescription opioids carry serious risks of addiction and overdose, especially with prolonged use. An opioid overdose, often marked by slowed breathing can cause sudden death. The use of prescription opioids can have a number of side effects as well, even when taken as directed:      Tolerance - meaning you might need to take more of a medication for the same pain relief    Physical dependence - meaning you have symptoms of withdrawal when a medication is stopped    Increased sensitivity to pain    Constipation    Nausea, vomiting, and dry mouth    Sleepiness and dizziness    Confusion    Depression    Low levels of testosterone  that can result in lower sex drive, energy, and strength    Itching and sweating    RISKS ARE GREATER WITH:    History of drug misuse, substance use disorder, or overdose    Mental health conditions (such as depression or anxiety)    Sleep apnea    Older age (65 years or older)    Pregnancy    Avoid alcohol while taking prescription opioids.   Also, unless specifically advised by your health care provider, medications to avoid include:    Benzodiazepines (such as Xanax or Valium)    Muscle relaxants (such as Soma or Flexeril)    Hypnotics (such as Ambien or Lunesta)    Other prescription opioids    KNOW YOUR OPTIONS:  Talk to your health care provider about ways to manage your pain that do not involve prescription opioids. Some of these options may actually work better and have fewer risks and side effects:    Pain relievers such as acetaminophen, ibuprofen, and naproxen    Some medications that are also used for depression or seizures    Physical therapy and exercise    Cognitive behavioral therapy, a psychological, goal-directed approach, in which patients learn how to modify physical, behavioral, and emotional triggers of pain and stress    IF YOU ARE PRESCRIBED OPIOIDS FOR PAIN:    Never take opioids in greater amounts or more often than prescribed    Follow up with your primary health care provider and work together to create a plan on how to manage your pain.    Talk about ways to help manage your pain that do not involve prescription opioids    Talk about all concerns and side effects    Help prevent misuse and abuse    Never sell or share prescription opioids    Never use another person's prescription opioids    Store prescription opioids in a secure place and out of reach of others (this may include visitors, children, friends, and family)    Visit www.cdc.gov/drugoverdose to learn about risks of opioid abuse and overdose    If you believe you may be struggling with addiction, tell your health care provider  and ask for guidance or call [facility]'s National Helpline at 8-231-038-HELP    LEARN MORE / www.cdc.gov/drugoverdose/prescribing/guideline.html    Safely dispose of unused prescription opioids: Find your local drug take-back programs and more information about the importance of safe disposal at www.doseofreality.mn.gov             Medication List: This is a list of all your medications and when to take them. Check marks below indicate your daily home schedule. Keep this list as a reference.      Medications           Morning Afternoon Evening Bedtime As Needed    albuterol 108 (90 BASE) MCG/ACT Inhaler   Commonly known as:  PROAIR HFA/PROVENTIL HFA/VENTOLIN HFA   Inhale 2 puffs into the lungs every 6 hours as needed for shortness of breath / dyspnea or wheezing                                   beclomethasone 80 MCG/ACT Inhaler   Commonly known as:  QVAR   Inhale 1 puff into the lungs 2 times daily                                      hydrOXYzine 25 MG tablet   Commonly known as:  ATARAX   Take 1 tablet (25 mg) by mouth every 6 hours as needed for itching (and nausea and pain)   Last time this was given:  25 mg on 2/1/2018 12:06 AM                                   ibuprofen 600 MG tablet   Commonly known as:  ADVIL/MOTRIN   Take 1 tablet (600 mg) by mouth every 6 hours as needed for pain (mild)   Next Dose Due:  Can take at 5:00 pm                                   loratadine 10 MG tablet   Commonly known as:  CLARITIN   Take 10 mg by mouth daily as needed                                   multivitamin, therapeutic with minerals Tabs tablet   Take 1 tablet by mouth daily                                   oxyCODONE-acetaminophen 5-325 MG per tablet   Commonly known as:  PERCOCET   Take 1-2 tablets by mouth every 4 hours as needed for pain (moderate to severe)   Last time this was given:  2 tablets on 2/1/2018 11:55 AM   Next Dose Due:  Can take at 4:00 pm                                   PANTOPRAZOLE SODIUM PO   Take  40 mg by mouth every morning (before breakfast)                                   senna-docusate 8.6-50 MG per tablet   Commonly known as:  SENOKOT-S;PERICOLACE   Take 1-2 tablets by mouth 2 times daily Take while on oral narcotics to prevent or treat constipation.                                                More Information        Discharge Instructions for Laparoscopic Treatment of Endometriosis  You have been diagnosed with endometriosis, a disease that affects your reproductive organs and your monthly menstrual cycle. It can cause cramps and pain during your periods or pelvic pain throughout the month. Some cases cause infertility (the inability to become pregnant).  There is no cure for endometriosis, but you can be treated. You and your doctor decided on laparoscopic treatment for you. During your procedure, the doctor made small incisions in your abdomen and used surgical instruments to remove or treat the diseased tissue. Your incisions and the area around them may be sore or tender. You may also feel pain in your upper back or shoulders. This is from the gas used to enlarge your abdomen to allow your doctor to see and treat the endometriosis. This pain usually goes away within a day or two.  Here's what you can do at home to help with your recovery.  Activity    Plan to rest for a week after your surgery, although you may feel OK within a few days.    While you recover, have friends or family help you with chores and errands.    Walk as often as you feel able.    Don t lift anything heavier than 10 pounds to avoid straining your incisions.    Don t push a vacuum or do other strenuous housework until the doctor says it s OK.    Climb stairs slowly and pause after every few steps.    Don t drive for a few days after the surgery. You may drive as soon as you are able to move comfortably from side to side as long as you aren't taking any narcotics.  Other home care    Take your medication exactly as  directed. Don t skip doses.    Continue with the coughing and deep breathing exercises that you learned in the hospital.    Avoid constipation.    Eat fruits, vegetables, and whole grains.    Drink 6 to 8 glasses of water every day unless directed otherwise.    Use a laxative or a mild stool softener if your doctor says it s OK.    Shower as usual.    Wash your incision with mild soap and water. Pat it dry. Don t use oils, powders, or lotions on your incision.    Don t have sexual intercourse or use tampons or douches until your doctor says it s safe to do so.    Report hot flashes, mood swings, and irritability to your doctor. There may be medications that can help you.  Follow-up  Make a follow-up appointment as directed by our staff.     When to call your doctor  Call your doctor right away if you have any of the following:    Redness, swelling, or drainage at your incision site    Fever of 100.4 F (38 C) or higher    Pain that is not relieved by your medication    Any unusual bleeding    Dizziness or fainting    Abdominal pain and swelling that get worse    Nausea and vomiting   Date Last Reviewed: 5/19/2015 2000-2017 [employer]. [address] [place] [address], [place] [address]. All rights reserved. This information is not intended as a substitute for professional medical care. Always follow your healthcare professional's instructions.

## 2018-01-31 NOTE — PLAN OF CARE
Problem: Patient Care Overview  Goal: Plan of Care/Patient Progress Review  Outcome: Improving  A&O. VSS on 1L. SBA. Umbilicus lap site oozing, reinforced. Advance diet as tolerated, tolerating liquids. Voided small amount. Bladder scanned 32 mL. IV Dilaudid given x1 with decrease in pain. Pain increased with bladder scan, Percocet given x1 with relief. Ice pack provided. IV Zofran given x1 with relief. Will continue to monitor.

## 2018-01-31 NOTE — OP NOTE
Procedure Date: 01/31/2018      PREOPERATIVE DIAGNOSES:   1.  Pelvic pain.   2.  Heavy menstrual bleeding.      POSTOPERATIVE DIAGNOSES:   1.  Pelvic pain.   2.  Heavy menstrual bleeding.      PROCEDURES:   1.  Da Cherry pelviscopy.   2.  Excision of endometriosis.   3.  Bilateral ureterolysis.   4.  Hysteroscopy with dilatation and curettage.   5.  Cystoscopy.      SURGEON:  Dr. Crowley.      ASSISTANT:  Li SIMPSON     ANESTHESIA:  General.      ESTIMATED BLOOD LOSS:  20 mL.      COMPLICATIONS:  None.      FINDINGS:   1.  There was a normal upper abdomen without evidence of diaphragmatic endometriosis or perihepatic adhesions.   2.  There was approximately a 1 cm patch of clear red peritoneal changes noted in the left anterior cul-de-sac.   3.  There was a 1.1 cm x 2 cm patch of red gelatinous endometriosis on the posterior uterine serosa.   4.  There were red, clear, white fibrotic lesions noted throughout both ovarian fossae, which were distorting the anatomy and also overlying the ureters, necessitating ureterolysis.   5.  There was a 1 cm area of denser, more invasive endometriosis in the right ovarian fossa just lateral to the ureter.   6.  There were red type lesions noted in the posterior cul-de-sac, encompassing approximately 2 x 3 cm.   7.  There was a normal hysteroscopy without evidence of polyps or fibroids.   8.  There is a normal cystoscopy without evidence of interstitial cystitis or bladder injury, and there was brisk flow of urine from both ureteral ostia.      INDICATIONS:  The patient is a 22-year-old female with a history of pelvic pain, including several laparoscopies.  She has had persistent symptoms despite having used hormonal suppression with birth control pills as well as Depo-Lupron.  Given this, the decision was made to proceed surgically to look for persistent endometriosis.      DESCRIPTION OF PROCEDURE:  After administration of anesthesia, the patient was placed in the  dorsal lithotomy position, prepped and draped in normal sterile fashion.  A De La Fuente catheter was placed, a speculum placed in the vagina and a single-tooth tenaculum placed on the anterior lip of the cervix.  Cervical fossulate was dilated, and a ALEX uterine manipulator placed.  I removed the tenaculum and speculum and moved to the abdominal portion of the procedure.  The umbilicus was injected with 0.25% Marcaine.  A Veress needle was placed to its correct intraperitoneal position.  A pneumoperitoneum was established.  A 1 cm incision was made in the umbilicus, and a 5 mm Visiport was placed under direct visualization to its correct intraperitoneal position.  The camera was placed, and an 8 mm Da Cherry trocar was placed in the right lateral abdomen and another 8 mm Da Cherry port placed in the left lateral abdomen and an 8 mm assistant port placed in the right upper quadrant.  The 5 mm Visiport was removed, and the Da Cherry camera trocar was placed.  The patient was placed into steep Trendelenburg, table brought down as far as possible and the Da Cherry robot brought up and docked without complications.  A monopolar hook was placed on the right #1 arm and a bipolar fenestrated grasper on the left #2 arm.  I scrubbed out and moved to the console.  I evaluated the pelvis as previously mentioned.  I began on the anterior cul-de-sac.  I identified the area of abnormal peritoneum in the left anterior cul-de-sac and outlined this with my hook and excised this by dissecting retroperitoneally.  This was removed and will be sent for permanent pathology.  I then had my assistant elevate the uterus ventrally so I could look at the posterior compartment.  There were some physiologic adhesions of the rectosigmoid to the pelvic brim, which I took down for additional visualization.  There was somewhat fibrotic-appearing peritoneum along the areas of adhesions, and so, I went ahead and excised this now.  This will also sent for  permanent pathology.  I then directed my attention to the peritoneum and the left ovarian fossa which was obviously abnormal and distorted.  I could see that the lesions were overlying the ureter on the left side, and therefore, I felt that ureterolysis was necessary to protect the ureter and sure I could get the peritoneum and endometriosis away.  I identified the ureters across the pelvic brim on the left side and tented the peritoneum up and began to gently dissect the peritoneum up and away from the ureter along its course along the left pelvic sidewall until it dove into the deeper tissue.  With the ureter out of harm's way, I then dissected and excised the peritoneum left and lateral of the ureter to the base of the ovary and then right and medial to the uterosacral ligament.  This tissue was removed en bloc and will be sent for permanent pathology.  I then moved to the right side in a similar fashion, had my assistant elevate the ovary.  There were some adhesions of the right ovary to the right pelvic sidewall, which I took down sharply without complications.  There was also an ovarian cyst which I removed.  Given the adhesion of the right ovary to the right pelvic sidewall, I went ahead and decided to tether that right ovary up to the round ligament, and using a 3-0 Vicryl, I went ahead and did this without complications; thus, elevating the right ovary out of the pelvis and hopefully keeping it from adhering back down.  With improved visualization, I then again noted the abnormal peritoneum in the right ovarian fossa throughout as well as overlying the ureter.  I identified the ureter at the pelvic brim and entered retroperitoneally and began to gently elevate the peritoneum up and away from the ureter along its course along the right pelvic sidewall.  With the ureter now out of harm's way, I was able to excise the peritoneum right and lateral to the ureter and then left and medial to the uterosacral  ligament.  Of note, there was a patch of perhaps 1 cm just lateral to the right ureter that appeared to have more densely invasive endometriosis, and so I did need to excise more deeply to get to excise it in its entirety.  I then moved to the posterior cul-de-sac, and there were red lesions behind both the cervix and the deeper cul-de-sac.  I went ahead and excised these with my monopolar hook by dissecting retroperitoneally and carefully elevating this peritoneum away from the underlying more fatty tissue.  I did this and was very careful at all times to know where the rectum was underneath.  At the end of this, all abnormal peritoneum had been removed.  The pelvis was copiously irrigated with saline solution, and there was fairly good hemostasis except for some peroneal oozing from the extensive dissection.  I went ahead and sprayed Frederick and then covered all raw surfaces with Interceed.  At this point, the abdominal portions of the procedures were ended.  The pneumoperitoneum evacuated, instrumentation and trocars were removed.  The robot was undocked.  Skin was closed with 4-0 Vicryl in a subcuticular fashion.  I scrubbed back in and moved below.  I removed the De La Fuente catheter and uterine manipulator.  I placed a speculum and single-tooth tenaculum on the anterior lip of the cervix.  The diagnostic hysteroscope was inserted, and there was no evidence of polyps or fibroids, although there was somewhat thickened and ragged endometrial lining.  I went ahead and did a circumferential curettage, yielding a moderate amount of tissue.  I then removed the speculum and tenaculum.  I placed the diagnostic cystoscope with the findings as previously mentioned.  When this was completed, the bladder was drained, and debriefing was performed.  Specimens identified, and the patient recalled from anesthesia without difficulty.         SANDY AJ MD             D: 01/31/2018   T: 01/31/2018   MT: JUDY      Name:      OMEGA, KAILEE   MRN:      [id number]        Account:        [id number]   :      1995           Procedure Date: 2018      Document: [id number]

## 2018-02-01 VITALS
HEIGHT: 64 IN | TEMPERATURE: 99 F | BODY MASS INDEX: 31.8 KG/M2 | RESPIRATION RATE: 16 BRPM | DIASTOLIC BLOOD PRESSURE: 48 MMHG | WEIGHT: 186.3 LBS | SYSTOLIC BLOOD PRESSURE: 103 MMHG | OXYGEN SATURATION: 95 %

## 2018-02-01 LAB
COPATH REPORT: NORMAL
GLUCOSE BLDC GLUCOMTR-MCNC: 117 MG/DL (ref 70–99)

## 2018-02-01 PROCEDURE — 25000128 H RX IP 250 OP 636: Performed by: OBSTETRICS & GYNECOLOGY

## 2018-02-01 PROCEDURE — 40000934 ZZH STATISTIC OUTPATIENT (NON-OBS) DAY

## 2018-02-01 PROCEDURE — 82962 GLUCOSE BLOOD TEST: CPT

## 2018-02-01 PROCEDURE — 25000132 ZZH RX MED GY IP 250 OP 250 PS 637: Performed by: OBSTETRICS & GYNECOLOGY

## 2018-02-01 RX ADMIN — OXYCODONE HYDROCHLORIDE AND ACETAMINOPHEN 2 TABLET: 5; 325 TABLET ORAL at 07:36

## 2018-02-01 RX ADMIN — OXYCODONE HYDROCHLORIDE AND ACETAMINOPHEN 2 TABLET: 5; 325 TABLET ORAL at 11:55

## 2018-02-01 RX ADMIN — HYDROMORPHONE HYDROCHLORIDE 0.2 MG: 1 INJECTION, SOLUTION INTRAMUSCULAR; INTRAVENOUS; SUBCUTANEOUS at 00:05

## 2018-02-01 RX ADMIN — ONDANSETRON 4 MG: 2 INJECTION INTRAMUSCULAR; INTRAVENOUS at 10:52

## 2018-02-01 RX ADMIN — KETOROLAC TROMETHAMINE 30 MG: 30 INJECTION, SOLUTION INTRAMUSCULAR at 10:52

## 2018-02-01 RX ADMIN — HYDROXYZINE HYDROCHLORIDE 25 MG: 25 TABLET ORAL at 13:20

## 2018-02-01 RX ADMIN — KETOROLAC TROMETHAMINE 30 MG: 30 INJECTION, SOLUTION INTRAMUSCULAR at 04:23

## 2018-02-01 RX ADMIN — RANITIDINE 150 MG: 150 TABLET ORAL at 07:36

## 2018-02-01 RX ADMIN — HYDROXYZINE HYDROCHLORIDE 25 MG: 25 TABLET ORAL at 00:06

## 2018-02-01 RX ADMIN — OXYCODONE HYDROCHLORIDE AND ACETAMINOPHEN 2 TABLET: 5; 325 TABLET ORAL at 02:50

## 2018-02-01 NOTE — PROGRESS NOTES
Doing well. Surgery and results discussed  VSS afebrile  Abdomen soft non tender, incisons dry  Ext. Negative  A: doing well  P Discharge home.

## 2018-02-01 NOTE — PLAN OF CARE
Problem: Patient Care Overview  Goal: Plan of Care/Patient Progress Review  Outcome: Adequate for Discharge Date Met: 02/01/18  A&O x4. VSS. DC instructions given and reviewed. All questions answered. Pt verbalizes understanding of DC care.  DC medications filled and sent with pt.

## 2018-02-01 NOTE — PLAN OF CARE
Problem: Patient Care Overview  Goal: Plan of Care/Patient Progress Review  Outcome: No Change  A&Ox4.  VSS.  Up with SBA.  IV dilaudid given for pain x2.  Zofran x1.  PO fair.  Scheduled toradol.  Ambulated in neil.  Voiding adequate amounts.  4 abdominal sites c/d/i.

## 2018-02-01 NOTE — PLAN OF CARE
Problem: Patient Care Overview  Goal: Plan of Care/Patient Progress Review  Outcome: Improving  A&O. VSS on RA. Independent. Tolerating regular diet. Lap sites C/D/I. Pain controlled with scheduled Toradol and PRN Percocet.

## 2018-06-20 ENCOUNTER — RECORDS - HEALTHEAST (OUTPATIENT)
Dept: LAB | Facility: CLINIC | Age: 23
End: 2018-06-20

## 2018-06-21 LAB — HBV SURFACE AB SERPL IA-ACNC: ABNORMAL M[IU]/ML

## 2019-01-09 ENCOUNTER — RECORDS - HEALTHEAST (OUTPATIENT)
Dept: LAB | Facility: CLINIC | Age: 24
End: 2019-01-09

## 2019-01-10 LAB
CORTIS SERPL-MCNC: 10.9 UG/DL
ESTRADIOL SERPL-MCNC: 84 PG/ML
FSH SERPL-ACNC: 4.1 MIU/ML
LH SERPL-ACNC: 9.1 MIU/ML
PROLACTIN SERPL-MCNC: <4 NG/ML (ref 0–20)

## 2019-01-14 LAB — ACTH PLAS-MCNC: 11 PG/ML

## 2019-01-15 LAB — IGF-I BLD-MCNC: 389 NG/ML (ref 103–326)

## 2019-01-28 ENCOUNTER — RECORDS - HEALTHEAST (OUTPATIENT)
Dept: LAB | Facility: CLINIC | Age: 24
End: 2019-01-28

## 2019-01-30 LAB — GH SERPL-MCNC: 1.7 UG/L

## 2019-02-01 LAB — IGF-I BLD-MCNC: 361 NG/ML (ref 103–326)

## 2019-03-05 ENCOUNTER — RECORDS - HEALTHEAST (OUTPATIENT)
Dept: LAB | Facility: CLINIC | Age: 24
End: 2019-03-05

## 2019-03-05 ENCOUNTER — HOSPITAL ENCOUNTER (OUTPATIENT)
Facility: CLINIC | Age: 24
End: 2019-03-05
Attending: OBSTETRICS & GYNECOLOGY | Admitting: OBSTETRICS & GYNECOLOGY

## 2019-03-07 LAB
GH SERPL-MCNC: 0.1 UG/L
GH SERPL-MCNC: 0.3 UG/L
GH SERPL-MCNC: <0.1 UG/L

## 2019-04-12 RX ORDER — CEFAZOLIN SODIUM 1 G/3ML
1 INJECTION, POWDER, FOR SOLUTION INTRAMUSCULAR; INTRAVENOUS SEE ADMIN INSTRUCTIONS
Status: CANCELLED | OUTPATIENT
Start: 2019-04-12

## 2019-04-12 RX ORDER — CEFAZOLIN SODIUM 2 G/100ML
2 INJECTION, SOLUTION INTRAVENOUS
Status: CANCELLED | OUTPATIENT
Start: 2019-04-12

## 2020-12-09 NOTE — ANESTHESIA CARE TRANSFER NOTE
Patient: Micki Lehman    Procedure(s):  DAVINCI LAPAROSCOPY, EXCISION OF ENDOMETRIOSIS, BILATERAL URTEROLYSIS, CYSTOSCOPY, HYSTEROSCOPY, DILATION AND CURETTAGE - Wound Class: I-Clean   - Wound Class: II-Clean Contaminated   - Wound Class: II-Clean Contaminated    Diagnosis: PELVIC PAIN, ENDOMETRIOSIS  Diagnosis Additional Information: No value filed.    Anesthesia Type:   General, ETT     Note:  Airway :Face Mask  Patient transferred to:PACU  Comments: Patient spontaneously breathing and following commands. VSS. Report given to RN.Handoff Report: Identifed the Patient, Identified the Reponsible Provider, Reviewed the pertinent medical history, Discussed the surgical course, Reviewed Intra-OP anesthesia mangement and issues during anesthesia, Set expectations for post-procedure period and Allowed opportunity for questions and acknowledgement of understanding      Vitals: (Last set prior to Anesthesia Care Transfer)    CRNA VITALS  1/31/2018 0935 - 1/31/2018 1016      1/31/2018             Pulse: 127    SpO2: 100 %    Resp Rate (observed): 24    Resp Rate (set): 10                Electronically Signed By: COLTON Herrmann CRNA  January 31, 2018  10:16 AM   Preop clearance for MRI next week   (info re: where to fax in HPI and plan) - EKG done at visit and cbc results pending

## 2025-05-01 NOTE — BRIEF OP NOTE
[facility] Brief Operative Note    Pre-operative diagnosis: PELVIC PAIN, ENDOMETRIOSIS   Post-operative diagnosis same   Procedure: Procedure(s):  DAVINCI LAPAROSCOPY, EXCISION OF ENDOMETRIOSIS, BILATERAL URTEROLYSIS, CYSTOSCOPY, HYSTEROSCOPY, DILATION AND CURETTAGE - Wound Class: I-Clean   - Wound Class: II-Clean Contaminated   - Wound Class: II-Clean Contaminated   Surgeon(s): Surgeon(s) and Role:  Panel 1:     * Ck Crowley MD - Primary     * Leslie Templeton PA-C - Assisting    Panel 2:     * Ck Crowley MD - Primary     * Leslie Templeton PA-C - Assisting    Panel 3:     * Ck Crowley MD - Primary     * Leslie Templeton PA-C - Assisting   Estimated blood loss: 20cc    Specimens:   ID Type Source Tests Collected by Time Destination   A : LEFT ANTERIOR CUL DE SAC Tissue Pelvis SURGICAL PATHOLOGY EXAM Ck Crowley MD 1/31/2018  8:22 AM    B : LEFT ABDOMINAL SIDEWALL Tissue Pelvis SURGICAL PATHOLOGY EXAM Ck Crowley MD 1/31/2018  8:27 AM    C : LEFT OVARIAN FOSSA Tissue Ovary, Left SURGICAL PATHOLOGY EXAM Ck Crowley MD 1/31/2018  8:35 AM    D : POSTERIOR UTERINE SEROSA Tissue Uterus SURGICAL PATHOLOGY EXAM Ck Crowley MD 1/31/2018  8:39 AM    E : RIGHT BROAD LIGAMENT Tissue Pelvis SURGICAL PATHOLOGY EXAM Ck Crowley MD 1/31/2018  8:43 AM    F : RIGHT OVARIAN CYST Tissue Ovary, Right SURGICAL PATHOLOGY EXAM Ck Crowley MD 1/31/2018  8:47 AM    G : RIGHT OVARIAN FOSSA Tissue Ovary, Right SURGICAL PATHOLOGY EXAM Ck Crowley MD 1/31/2018  9:01 AM    H : POSTERIOR CERVICAL PERITONEUM Tissue Peritoneum SURGICAL PATHOLOGY EXAM Ck Crowley MD 1/31/2018  9:03 AM    I : POSTERIOR CUL DE SAC Tissue Pelvis SURGICAL PATHOLOGY EXAM Ck Crowley MD 1/31/2018  9:11 AM    J : ENDOMETRIAL CURETTINGS Tissue Endometrium SURGICAL PATHOLOGY EXAM Ck Crowley MD  1/31/2018  9:30 AM       Findings: Normal upper abdomen  1 cm patch of red clear pertoneal changes noted in left anterior cul de sac  1x2 cm red patch of endometriosis on posterior uterine serosa  Red/clear/white fibrotic lesions noted throught bilateral ovarian fossas and overly the ureters necessitating ureterolysis  1 cm area of dense fibrotic with probable deeper invasion in right ovarian fossa  Red type lesions in posterior cul de sac (approximately 2x3 cm)  Normal hysteroscopy  Normal cystoscopy      [de-identified] : Patient is a 61 y/o F who presents a chief complaint of left breast calcs.  Diag L MMG/US 3/13/25 ([facility]) - Rec stereotactic biopsy of the indeterminate grouping of developing left breast calcifications (BIRADS 4)  Screening MMG/US 1/23/25 - additional imaging BIRADS 0.  Personal history of prior benign bilateral breast surgical biopsies.  No FH breast/ovarian cancer

## (undated) DEVICE — KIT PATIENT POSITIONING PIGAZZI LATEX FREE 40580

## (undated) DEVICE — SU VICRYL 4-0 PS-2 18" UND J496H

## (undated) DEVICE — ESU CORD BIPOLAR 12' E0509

## (undated) DEVICE — DAVINCI S CAUTERY HOOK 420183

## (undated) DEVICE — TUBING IRRIG CYSTO/BLADDER SET 81" LF 2C4040

## (undated) DEVICE — SUCTION IRR TRUMPET VALVE LAP ASU1201

## (undated) DEVICE — CATH TRAY FOLEY SURESTEP 16FR WDRAIN BAG STLK LATEX A300316A

## (undated) DEVICE — DRSG TEGADERM 4X10" 1627

## (undated) DEVICE — NDL 19GA 1.5"

## (undated) DEVICE — GLOVE PROTEXIS BLUE W/NEU-THERA 7.0  2D73EB70

## (undated) DEVICE — SOL WATER IRRIG 3000ML BAG 2B7117

## (undated) DEVICE — LINEN TOWEL PACK X5 5464

## (undated) DEVICE — DAVINCI SI DRAPE ACCESSORY KIT 3-ARM 420290

## (undated) DEVICE — SOL NACL 0.9% IRRIG 1000ML BOTTLE 07138-09

## (undated) DEVICE — DAVINCI OBTURATOR 8MM BLADELESS 420023

## (undated) DEVICE — DRSG TELFA 3X8" 1238

## (undated) DEVICE — SOL NACL 0.9% INJ 1000ML BAG 2B1324X

## (undated) DEVICE — DAVINCI S CANNULA SEAL 8.5-13MM 420206

## (undated) DEVICE — ESU GROUND PAD UNIVERSAL W/O CORD

## (undated) DEVICE — HEMOSTAT ABSORBABLE ARISTA 5GM SM0007-USA

## (undated) DEVICE — BARRIER INTERCEED 3X4" 4350

## (undated) DEVICE — DAVINCI S ESU FCP BIPOLAR MARYLAND 420172

## (undated) DEVICE — UTERINE MANIPULATOR RUMI 5.1MMX6CM UML516

## (undated) DEVICE — DAVINCI HOT SHEARS TIP COVER  400180

## (undated) DEVICE — TUBING CONMED AIRSEAL SMOKE EVAC INSUFFLATION ASM-EVAC

## (undated) DEVICE — ESU CORD MONOPOLAR 10'  E0510

## (undated) DEVICE — SUCTION CANISTER MEDIVAC LINER 3000ML W/LID 65651-530

## (undated) DEVICE — ENDO TROCAR FIRST ENTRY KII FIOS Z-THRD 05X100MM CTF03

## (undated) DEVICE — ENDO TROCAR CONMED AIRSEAL BLADELESS 08X120MM IAS8-120LP

## (undated) DEVICE — PACK DAVINCI GYN SMA15GDFS1

## (undated) DEVICE — GLOVE PROTEXIS W/NEU-THERA 7.5  2D73TE75

## (undated) DEVICE — BLADE CLIPPER 4406

## (undated) RX ORDER — HYDROMORPHONE HYDROCHLORIDE 1 MG/ML
INJECTION, SOLUTION INTRAMUSCULAR; INTRAVENOUS; SUBCUTANEOUS
Status: DISPENSED
Start: 2018-01-31

## (undated) RX ORDER — LIDOCAINE HYDROCHLORIDE 20 MG/ML
INJECTION, SOLUTION EPIDURAL; INFILTRATION; INTRACAUDAL; PERINEURAL
Status: DISPENSED
Start: 2018-01-31

## (undated) RX ORDER — DEXAMETHASONE SODIUM PHOSPHATE 4 MG/ML
INJECTION, SOLUTION INTRA-ARTICULAR; INTRALESIONAL; INTRAMUSCULAR; INTRAVENOUS; SOFT TISSUE
Status: DISPENSED
Start: 2018-01-31

## (undated) RX ORDER — BUPIVACAINE HYDROCHLORIDE 2.5 MG/ML
INJECTION, SOLUTION EPIDURAL; INFILTRATION; INTRACAUDAL
Status: DISPENSED
Start: 2018-01-31

## (undated) RX ORDER — EPINEPHRINE 1 MG/ML
INJECTION, SOLUTION INTRAMUSCULAR; SUBCUTANEOUS
Status: DISPENSED
Start: 2018-01-31

## (undated) RX ORDER — MEPERIDINE HYDROCHLORIDE 25 MG/ML
INJECTION INTRAMUSCULAR; INTRAVENOUS; SUBCUTANEOUS
Status: DISPENSED
Start: 2018-01-31

## (undated) RX ORDER — FENTANYL CITRATE 50 UG/ML
INJECTION, SOLUTION INTRAMUSCULAR; INTRAVENOUS
Status: DISPENSED
Start: 2018-01-31

## (undated) RX ORDER — LIDOCAINE HYDROCHLORIDE 10 MG/ML
INJECTION, SOLUTION EPIDURAL; INFILTRATION; INTRACAUDAL; PERINEURAL
Status: DISPENSED
Start: 2018-01-31

## (undated) RX ORDER — GLYCOPYRROLATE 0.2 MG/ML
INJECTION, SOLUTION INTRAMUSCULAR; INTRAVENOUS
Status: DISPENSED
Start: 2018-01-31

## (undated) RX ORDER — ONDANSETRON 2 MG/ML
INJECTION INTRAMUSCULAR; INTRAVENOUS
Status: DISPENSED
Start: 2018-01-31

## (undated) RX ORDER — PROPOFOL 10 MG/ML
INJECTION, EMULSION INTRAVENOUS
Status: DISPENSED
Start: 2018-01-31